# Patient Record
Sex: FEMALE | Race: WHITE | HISPANIC OR LATINO | Employment: FULL TIME | URBAN - METROPOLITAN AREA
[De-identification: names, ages, dates, MRNs, and addresses within clinical notes are randomized per-mention and may not be internally consistent; named-entity substitution may affect disease eponyms.]

---
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
## 2025-02-21 ENCOUNTER — OFFICE VISIT (OUTPATIENT)
Dept: BARIATRICS/WEIGHT MGMT | Facility: CLINIC | Age: 20
End: 2025-02-21
Payer: COMMERCIAL

## 2025-02-21 VITALS
HEIGHT: 64 IN | TEMPERATURE: 97.8 F | BODY MASS INDEX: 33.46 KG/M2 | WEIGHT: 196 LBS | HEART RATE: 58 BPM | DIASTOLIC BLOOD PRESSURE: 74 MMHG | SYSTOLIC BLOOD PRESSURE: 132 MMHG

## 2025-02-21 DIAGNOSIS — E66.811 OBESITY, CLASS I, BMI 30-34.9: Primary | ICD-10-CM

## 2025-02-21 DIAGNOSIS — Z71.3 DIETARY COUNSELING: ICD-10-CM

## 2025-02-21 DIAGNOSIS — Z98.84 S/P LAPAROSCOPIC SLEEVE GASTRECTOMY: ICD-10-CM

## 2025-02-21 PROBLEM — E66.812 OBESITY, CLASS II, BMI 35-39.9: Status: RESOLVED | Noted: 2024-11-22 | Resolved: 2025-02-21

## 2025-02-21 PROBLEM — IMO0002 BMI, PEDIATRIC, 99TH PERCENTILE OR GREATER FOR AGE: Status: RESOLVED | Noted: 2024-08-13 | Resolved: 2025-02-21

## 2025-02-21 PROBLEM — E66.01 OBESITY, CLASS III, BMI 40-49.9 (MORBID OBESITY): Status: RESOLVED | Noted: 2024-06-25 | Resolved: 2025-02-21

## 2025-02-21 NOTE — PROGRESS NOTES
[facility] BARIATR[facility] BARIATRIC SURGERY  [address]  [facility] [id number] [phone]  [address]  [facility] [id number] [phone]  Dept: [phone]  2/21/2025      Erin López.  [id number]  [id number]  2005  female      Chief Complaint   Patient presents with    Follow-up     6 month follow up sleeve       BH Post-Op Bariatric Surgery:   Erin López is status post Laparoscopic Sleeve procedure, performed on 8/20/2024     HPI:   Today's weight is 88.9 kg (196 lb) pounds, today's BMI is Body mass index is 34 kg/m²., has a  loss of 24 pounds since the last visit and weight loss since surgery is 83 pounds. The patient reports a decreased portion size and loss of appetite.      Erin López denies n/v/r/d and reports tolerating regular diet.  Sometimes forgets to eat. Working out with weights and cardio.  Incorporated weights 2 months ago.  More focused on protein as is trying to build muscle.  Drinking protein shake in the morning.  Has protein chips/bars as a snack.  Trying to focus low calorie and high protein.  Tries to get some carbs bisohp before exercise.  Occasionally has pasta/pizza/hot wings but gets back on plan.       Diet and Exercise: Diet history reviewed and discussed with the patient. Weight loss/gains to date discussed with the patient. The patient states they are eating 50+ grams of protein per day. She reports eating 3 meals per day, a typical portion size of 1 cup, eating 0-1 snacks per day, drinking 5+ or more 8-oz. glasses of water per day, no carbonated beverage consumption and exercising regularly.     Supplements: mtv.     Review of Systems   Constitutional:  Positive for activity change and appetite change.   Respiratory:  Negative for shortness of breath.    Cardiovascular:  Negative for chest pain.   All other systems reviewed and are negative.      Patient Active Problem List   Diagnosis    Body  mass index (BMI) pediatric, 95th percentile for age to less than 120% of the 95th percentile for age    Dietary counseling    Fatigue    Back pain    S/P laparoscopic sleeve gastrectomy    Postoperative malabsorption    Obesity, Class I, BMI 30-34.9       Past Medical History:   Diagnosis Date    Hearing disorder        The following portions of the patient's history were reviewed and updated as appropriate: allergies, current medications, past medical history, past surgical history, and problem list.    Vitals:    02/21/25 1016   BP: 132/74   Pulse: 58   Temp: 97.8 °F (36.6 °C)       Physical Exam  Vitals reviewed.   Constitutional:       General: She is not in acute distress.     Appearance: Normal appearance. She is morbidly obese and obese.   HENT:      Head: Normocephalic and atraumatic.      Mouth/Throat:      Mouth: Mucous membranes are moist.      Pharynx: Oropharynx is clear.   Eyes:      General: No scleral icterus.     Extraocular Movements: Extraocular movements intact.      Conjunctiva/sclera: Conjunctivae normal.      Pupils: Pupils are equal, round, and reactive to light.   Cardiovascular:      Rate and Rhythm: Normal rate and regular rhythm.   Pulmonary:      Effort: Pulmonary effort is normal. No respiratory distress.   Abdominal:      General: Bowel sounds are normal.      Palpations: Abdomen is soft.   Musculoskeletal:         General: Normal range of motion.      Cervical back: Normal range of motion and neck supple.   Skin:     General: Skin is warm and dry.   Neurological:      General: No focal deficit present.      Mental Status: She is alert and oriented to person, place, and time.   Psychiatric:         Mood and Affect: Mood normal.         Behavior: Behavior normal.         Thought Content: Thought content normal.         Judgment: Judgment normal.         Assessment:   Post-op, the patient is doing well.     Encounter Diagnoses   Name Primary?    Obesity, Class I, BMI 30-34.9 Yes    S/P  laparoscopic sleeve gastrectomy     Body mass index (BMI) pediatric, 95th percentile for age to less than 120% of the 95th percentile for age     Dietary counseling        Plan:   Diagnoses and all orders for this visit:    1. Obesity, Class I, BMI 30-34.9 (Primary)    2. S/P laparoscopic sleeve gastrectomy    3. Body mass index (BMI) pediatric, 95th percentile for age to less than 120% of the 95th percentile for age    4. Dietary counseling      Encouraged patient to be sure to get plenty of lean protein per day through small frequent meals all with a protein source.   Activity restrictions: none.   Recommended patient be sure to get at least 70 grams of protein per day by eating small, frequent meals all with high lean protein choices. Be sure to limit/cut back on daily carbohydrate intake. Discussed with the patient the recommended amount of water per day to intake- half of body weight in ounces. Reviewed vitamin requirements. Be sure to do routine exercise, 150 minutes per week minimum, including both cardio and strength training.     Instructions / Recommendations: dietary counseling recommended, recommended a daily protein intake of  grams, vitamin supplement(s) recommended, recommended exercising at least 150 minutes per week, behavior modifications recommended and instructed to call the office for concerns, questions, or problems.     The patient was instructed to follow up in 3 months.     Total time spent during this encounter today was 25 minutes

## (undated) DEVICE — GOWN,NON-REINFORCED,SIRUS,SET IN SLV,XL: Brand: MEDLINE

## (undated) DEVICE — SOL NACL 0.9PCT 1000ML

## (undated) DEVICE — SUT VIC 0 CT1 27IN DYED J340H

## (undated) DEVICE — SUT MNCRYL PLS ANTIB UD 4/0 PS2 18IN

## (undated) DEVICE — TUBING, SUCTION, 1/4" X 10', STRAIGHT: Brand: MEDLINE

## (undated) DEVICE — ARM DRAPE

## (undated) DEVICE — TROC STANDARDTROCAR FOR/TITANSGS 19MM DISP STRL

## (undated) DEVICE — SUT SILK 0 SH 30IN K834H

## (undated) DEVICE — THE DEVICE IS A DISPOSABLE, LIGATURE PASSING, SUTURING APPARATUS AND NEEDLE GUIDE FOR THE ABDOMINAL WALL WHICH IS NON-POWERED, HAND-HELD, AND HAND-MANIPULATED,INTENDED TO BE USED IN VARIOUS GENERAL SURGICAL PROCEDURES. THE DEVICE INCLUDES A LIGATURE CARRIER PATHWAY, NEEDLE GUIDE, TWO NEEDLES, REFERENCE PLANE T-BAR, AND A GUIDEWIRE. THE HANDLE OF THE DEVICE PROVIDES TWO DIAMETRICALLY OPPOSED ENCLOSED GUIDEWAYS FOR THE ADVANCEMENT AND RETRACTION OF THE NEEDLES UNDER MANUAL CONTROL OF A PLUNGER LOCATED AT THE PROXIMAL END OF THE DEVICE.AS PART OF THE M-CLOSE CONVENIENCE KIT, A NERVE BLOCK NEEDLE IS INCLUDED FOR THE ADMINISTRATION OF LOCAL ANESTHETIC AGENTS TO PROVIDE REGIONAL AND LOCAL ANESTHESIA.  A TELFA ANTIMICROBIAL, NON-ADHERENT PAD IS ALSO PROVIDED IN THE KIT FOR USE AS A PRIMARY DRESSING FOR THE SURGICAL INCISION.: Brand: M-CLOSE KIT

## (undated) DEVICE — GLV SURG SENSICARE PI MIC PF SZ6.5 LF STRL

## (undated) DEVICE — BLADELESS OBTURATOR: Brand: WECK VISTA

## (undated) DEVICE — CANN O2 ETCO2 FITS ALL CONN CO2 SMPL A/ 7IN DISP LF

## (undated) DEVICE — DECANTER BAG 9": Brand: MEDLINE INDUSTRIES, INC.

## (undated) DEVICE — BALN BOUGIE STD/TPR 38F

## (undated) DEVICE — KT ORCA ORCAPOD DISP STRL

## (undated) DEVICE — MSK PROC CURAPLEX O2 2/ADAPT 7FT

## (undated) DEVICE — OSC ROBOT BARIATRIC: Brand: MEDLINE INDUSTRIES, INC.

## (undated) DEVICE — LAPAROSCOPIC DISSECTOR: Brand: DEROYAL

## (undated) DEVICE — NDL HYPO PRECISIONGLIDE REG 21G 1 1/2

## (undated) DEVICE — ST TBG AIRSEAL FLTR SMOKE BIF

## (undated) DEVICE — ADAPT CLN BIOGUARD AIR/H2O DISP

## (undated) DEVICE — APL DUPLOSPRAYER MIS 40CM

## (undated) DEVICE — LAPAROVUE VISIBILITY SYSTEM LAPAROSCOPIC SOLUTIONS: Brand: LAPAROVUE

## (undated) DEVICE — GLV SURG PREMIERPRO ORTHO LTX PF SZ7.5 BRN

## (undated) DEVICE — BLCK/BITE BLOX W/DENTL/RIM W/STRAP 54F

## (undated) DEVICE — PATIENT RETURN ELECTRODE, SINGLE-USE, CONTACT QUALITY MONITORING, ADULT, WITH 9FT CORD, FOR PATIENTS WEIGING OVER 33LBS. (15KG): Brand: MEGADYNE

## (undated) DEVICE — 500ML,PRESSURE INFUSER W/STOPCOCK: Brand: MEDLINE

## (undated) DEVICE — VIOLET BRAIDED (POLYGLACTIN 910), SYNTHETIC ABSORBABLE SUTURE: Brand: COATED VICRYL

## (undated) DEVICE — ENDOPATH PNEUMONEEDLE INSUFFLATION NEEDLES WITH LUER LOCK CONNECTORS 120MM: Brand: ENDOPATH

## (undated) DEVICE — GLV SURG SENSICARE POLYISPRN W/ALOE PF LF 6.5 GRN STRL

## (undated) DEVICE — LN SMPL CO2 SHTRM SD STREAM W/M LUER

## (undated) DEVICE — SENSR O2 OXIMAX FNGR A/ 18IN NONSTR

## (undated) DEVICE — FRCP BX RADJAW4 NDL 2.8 240CM LG OG BX40

## (undated) DEVICE — SEAL

## (undated) DEVICE — CONN TBG Y 5 IN 1 LF STRL